# Patient Record
Sex: FEMALE | Race: WHITE | Employment: OTHER | URBAN - METROPOLITAN AREA
[De-identification: names, ages, dates, MRNs, and addresses within clinical notes are randomized per-mention and may not be internally consistent; named-entity substitution may affect disease eponyms.]

---

## 2017-04-17 ENCOUNTER — NEW PATIENT (OUTPATIENT)
Dept: URBAN - METROPOLITAN AREA CLINIC 26 | Facility: CLINIC | Age: 67
End: 2017-04-17

## 2017-04-17 VITALS
WEIGHT: 188 LBS | SYSTOLIC BLOOD PRESSURE: 154 MMHG | HEART RATE: 76 BPM | DIASTOLIC BLOOD PRESSURE: 83 MMHG | BODY MASS INDEX: 34.6 KG/M2 | HEIGHT: 62 IN

## 2017-04-17 DIAGNOSIS — H43.393: ICD-10-CM

## 2017-04-17 DIAGNOSIS — H53.8: ICD-10-CM

## 2017-04-17 DIAGNOSIS — H02.836: ICD-10-CM

## 2017-04-17 DIAGNOSIS — E11.3393: ICD-10-CM

## 2017-04-17 DIAGNOSIS — H04.123: ICD-10-CM

## 2017-04-17 DIAGNOSIS — H02.833: ICD-10-CM

## 2017-04-17 DIAGNOSIS — Z79.4: ICD-10-CM

## 2017-04-17 PROCEDURE — 92226 OPHTHALMOSCOPY (SUB): CPT

## 2017-04-17 PROCEDURE — 92134 CPTRZ OPH DX IMG PST SGM RTA: CPT

## 2017-04-17 PROCEDURE — 99204 OFFICE O/P NEW MOD 45 MIN: CPT

## 2017-04-17 ASSESSMENT — VISUAL ACUITY
OD_SC: 20/25-3
OS_SC: 20/25-1

## 2017-04-17 ASSESSMENT — TONOMETRY
OS_IOP_MMHG: 12
OD_IOP_MMHG: 12

## 2017-05-08 ENCOUNTER — FOLLOW UP (OUTPATIENT)
Dept: URBAN - METROPOLITAN AREA CLINIC 26 | Facility: CLINIC | Age: 67
End: 2017-05-08

## 2017-05-08 VITALS — DIASTOLIC BLOOD PRESSURE: 92 MMHG | SYSTOLIC BLOOD PRESSURE: 153 MMHG | HEIGHT: 55 IN | HEART RATE: 55 BPM

## 2017-05-08 DIAGNOSIS — H43.393: ICD-10-CM

## 2017-05-08 DIAGNOSIS — H53.8: ICD-10-CM

## 2017-05-08 DIAGNOSIS — Z79.4: ICD-10-CM

## 2017-05-08 DIAGNOSIS — E11.3493: ICD-10-CM

## 2017-05-08 PROCEDURE — 92014 COMPRE OPH EXAM EST PT 1/>: CPT

## 2017-05-08 PROCEDURE — 92250 FUNDUS PHOTOGRAPHY W/I&R: CPT

## 2017-05-08 PROCEDURE — 92235 FLUORESCEIN ANGRPH MLTIFRAME: CPT

## 2017-05-08 ASSESSMENT — VISUAL ACUITY
OD_CC: 20/25+1
OS_CC: 20/25+1

## 2017-05-08 ASSESSMENT — TONOMETRY
OS_IOP_MMHG: 18
OD_IOP_MMHG: 17

## 2017-05-09 ENCOUNTER — CLINIC PROCEDURE ONLY (OUTPATIENT)
Dept: URBAN - METROPOLITAN AREA CLINIC 26 | Facility: CLINIC | Age: 67
End: 2017-05-09

## 2017-05-09 DIAGNOSIS — E11.3493: ICD-10-CM

## 2017-05-09 PROCEDURE — 67228 TREATMENT X10SV RETINOPATHY: CPT

## 2017-05-09 ASSESSMENT — VISUAL ACUITY: OD_CC: 20/25

## 2017-05-09 ASSESSMENT — TONOMETRY: OD_IOP_MMHG: 17

## 2017-05-10 ENCOUNTER — CLINIC PROCEDURE ONLY (OUTPATIENT)
Dept: URBAN - METROPOLITAN AREA CLINIC 26 | Facility: CLINIC | Age: 67
End: 2017-05-10

## 2017-05-10 DIAGNOSIS — E11.3493: ICD-10-CM

## 2017-05-10 PROCEDURE — 67228 TREATMENT X10SV RETINOPATHY: CPT

## 2017-05-10 ASSESSMENT — VISUAL ACUITY: OS_SC: 20/30+2

## 2017-05-10 ASSESSMENT — TONOMETRY: OS_IOP_MMHG: 16

## 2017-09-18 ENCOUNTER — FOLLOW UP (OUTPATIENT)
Dept: URBAN - METROPOLITAN AREA CLINIC 26 | Facility: CLINIC | Age: 67
End: 2017-09-18

## 2017-09-18 VITALS
HEIGHT: 62 IN | DIASTOLIC BLOOD PRESSURE: 70 MMHG | WEIGHT: 193 LBS | SYSTOLIC BLOOD PRESSURE: 140 MMHG | BODY MASS INDEX: 35.51 KG/M2 | HEART RATE: 68 BPM

## 2017-09-18 DIAGNOSIS — Z79.4: ICD-10-CM

## 2017-09-18 DIAGNOSIS — H53.8: ICD-10-CM

## 2017-09-18 DIAGNOSIS — H43.393: ICD-10-CM

## 2017-09-18 DIAGNOSIS — E11.3493: ICD-10-CM

## 2017-09-18 PROCEDURE — 92134 CPTRZ OPH DX IMG PST SGM RTA: CPT

## 2017-09-18 PROCEDURE — 92250 FUNDUS PHOTOGRAPHY W/I&R: CPT

## 2017-09-18 PROCEDURE — 92014 COMPRE OPH EXAM EST PT 1/>: CPT

## 2017-09-18 PROCEDURE — 92235 FLUORESCEIN ANGRPH MLTIFRAME: CPT

## 2017-09-18 ASSESSMENT — TONOMETRY
OD_IOP_MMHG: 15
OS_IOP_MMHG: 15

## 2017-09-18 ASSESSMENT — VISUAL ACUITY
OD_SC: 20/20-1
OS_SC: 20/25+2

## 2018-03-19 ENCOUNTER — FOLLOW UP (OUTPATIENT)
Dept: URBAN - METROPOLITAN AREA CLINIC 26 | Facility: CLINIC | Age: 68
End: 2018-03-19

## 2018-03-19 VITALS — HEIGHT: 62 IN | BODY MASS INDEX: 32.2 KG/M2 | WEIGHT: 175 LBS

## 2018-03-19 DIAGNOSIS — H02.836: ICD-10-CM

## 2018-03-19 DIAGNOSIS — H04.123: ICD-10-CM

## 2018-03-19 DIAGNOSIS — Z79.4: ICD-10-CM

## 2018-03-19 DIAGNOSIS — H43.393: ICD-10-CM

## 2018-03-19 DIAGNOSIS — H26.491: ICD-10-CM

## 2018-03-19 DIAGNOSIS — H53.8: ICD-10-CM

## 2018-03-19 DIAGNOSIS — E11.3493: ICD-10-CM

## 2018-03-19 DIAGNOSIS — H02.833: ICD-10-CM

## 2018-03-19 PROCEDURE — 92235 FLUORESCEIN ANGRPH MLTIFRAME: CPT

## 2018-03-19 PROCEDURE — 92014 COMPRE OPH EXAM EST PT 1/>: CPT

## 2018-03-19 PROCEDURE — 92134 CPTRZ OPH DX IMG PST SGM RTA: CPT

## 2018-03-19 PROCEDURE — 92250 FUNDUS PHOTOGRAPHY W/I&R: CPT

## 2018-03-19 ASSESSMENT — TONOMETRY
OS_IOP_MMHG: 15
OD_IOP_MMHG: 15

## 2018-03-19 ASSESSMENT — VISUAL ACUITY
OD_SC: 20/20-1
OS_SC: 20/20

## 2018-09-12 ENCOUNTER — FOLLOW UP (OUTPATIENT)
Dept: URBAN - METROPOLITAN AREA CLINIC 26 | Facility: CLINIC | Age: 68
End: 2018-09-12

## 2018-09-12 VITALS — SYSTOLIC BLOOD PRESSURE: 182 MMHG | HEIGHT: 55 IN | HEART RATE: 51 BPM | DIASTOLIC BLOOD PRESSURE: 96 MMHG

## 2018-09-12 DIAGNOSIS — H43.393: ICD-10-CM

## 2018-09-12 DIAGNOSIS — Z79.4: ICD-10-CM

## 2018-09-12 DIAGNOSIS — H02.836: ICD-10-CM

## 2018-09-12 DIAGNOSIS — H04.123: ICD-10-CM

## 2018-09-12 DIAGNOSIS — H53.8: ICD-10-CM

## 2018-09-12 DIAGNOSIS — H02.833: ICD-10-CM

## 2018-09-12 DIAGNOSIS — E11.3413: ICD-10-CM

## 2018-09-12 PROCEDURE — 92235 FLUORESCEIN ANGRPH MLTIFRAME: CPT

## 2018-09-12 PROCEDURE — 67210 TREATMENT OF RETINAL LESION: CPT

## 2018-09-12 PROCEDURE — 92014 COMPRE OPH EXAM EST PT 1/>: CPT

## 2018-09-12 PROCEDURE — 92250 FUNDUS PHOTOGRAPHY W/I&R: CPT

## 2018-09-12 PROCEDURE — 92134 CPTRZ OPH DX IMG PST SGM RTA: CPT

## 2018-09-12 ASSESSMENT — VISUAL ACUITY
OD_SC: 20/25+2
OS_SC: 20/20-

## 2018-09-12 ASSESSMENT — TONOMETRY
OD_IOP_MMHG: 16
OS_IOP_MMHG: 17

## 2019-01-14 ENCOUNTER — FOLLOW UP (OUTPATIENT)
Dept: URBAN - METROPOLITAN AREA CLINIC 26 | Facility: CLINIC | Age: 69
End: 2019-01-14

## 2019-01-14 VITALS
SYSTOLIC BLOOD PRESSURE: 101 MMHG | WEIGHT: 163 LBS | HEIGHT: 62 IN | BODY MASS INDEX: 30 KG/M2 | HEART RATE: 61 BPM | DIASTOLIC BLOOD PRESSURE: 62 MMHG

## 2019-01-14 DIAGNOSIS — H53.8: ICD-10-CM

## 2019-01-14 DIAGNOSIS — E11.3413: ICD-10-CM

## 2019-01-14 DIAGNOSIS — Z79.4: ICD-10-CM

## 2019-01-14 DIAGNOSIS — H04.123: ICD-10-CM

## 2019-01-14 DIAGNOSIS — H02.836: ICD-10-CM

## 2019-01-14 DIAGNOSIS — H43.393: ICD-10-CM

## 2019-01-14 DIAGNOSIS — H02.833: ICD-10-CM

## 2019-01-14 PROCEDURE — 92014 COMPRE OPH EXAM EST PT 1/>: CPT

## 2019-01-14 PROCEDURE — 67210 TREATMENT OF RETINAL LESION: CPT

## 2019-01-14 PROCEDURE — 92235 FLUORESCEIN ANGRPH MLTIFRAME: CPT

## 2019-01-14 PROCEDURE — 92250 FUNDUS PHOTOGRAPHY W/I&R: CPT

## 2019-01-14 PROCEDURE — 92134 CPTRZ OPH DX IMG PST SGM RTA: CPT

## 2019-01-14 ASSESSMENT — VISUAL ACUITY
OD_CC: 20/20-2
OS_CC: 20/20-1

## 2019-01-14 ASSESSMENT — TONOMETRY
OD_IOP_MMHG: 09
OS_IOP_MMHG: 11

## 2019-01-31 ENCOUNTER — CLINIC PROCEDURE ONLY (OUTPATIENT)
Dept: URBAN - METROPOLITAN AREA CLINIC 26 | Facility: CLINIC | Age: 69
End: 2019-01-31

## 2019-01-31 DIAGNOSIS — E11.3413: ICD-10-CM

## 2019-01-31 PROCEDURE — 67210 TREATMENT OF RETINAL LESION: CPT

## 2019-01-31 ASSESSMENT — VISUAL ACUITY: OS_SC: 20/20

## 2019-01-31 ASSESSMENT — TONOMETRY: OS_IOP_MMHG: 12

## 2019-04-15 ENCOUNTER — POST-OP CHECK (OUTPATIENT)
Dept: URBAN - METROPOLITAN AREA CLINIC 26 | Facility: CLINIC | Age: 69
End: 2019-04-15

## 2019-04-15 VITALS — SYSTOLIC BLOOD PRESSURE: 148 MMHG | HEART RATE: 72 BPM | DIASTOLIC BLOOD PRESSURE: 82 MMHG | HEIGHT: 55 IN

## 2019-04-15 DIAGNOSIS — E11.3413: ICD-10-CM

## 2019-04-15 DIAGNOSIS — Z79.4: ICD-10-CM

## 2019-04-15 DIAGNOSIS — H53.8: ICD-10-CM

## 2019-04-15 DIAGNOSIS — H43.393: ICD-10-CM

## 2019-04-15 PROCEDURE — 92134 CPTRZ OPH DX IMG PST SGM RTA: CPT

## 2019-04-15 PROCEDURE — 99024 POSTOP FOLLOW-UP VISIT: CPT

## 2019-04-15 PROCEDURE — 92235 FLUORESCEIN ANGRPH MLTIFRAME: CPT

## 2019-04-15 PROCEDURE — 92250 FUNDUS PHOTOGRAPHY W/I&R: CPT

## 2019-04-15 ASSESSMENT — VISUAL ACUITY
OD_SC: 20/25+1
OS_SC: 20/30+2

## 2019-04-15 ASSESSMENT — TONOMETRY
OD_IOP_MMHG: 11
OS_IOP_MMHG: 11

## 2019-05-23 ENCOUNTER — FOLLOW UP (OUTPATIENT)
Dept: URBAN - METROPOLITAN AREA CLINIC 26 | Facility: CLINIC | Age: 69
End: 2019-05-23

## 2019-05-23 DIAGNOSIS — H04.123: ICD-10-CM

## 2019-05-23 DIAGNOSIS — H53.8: ICD-10-CM

## 2019-05-23 DIAGNOSIS — Z79.4: ICD-10-CM

## 2019-05-23 DIAGNOSIS — H43.393: ICD-10-CM

## 2019-05-23 DIAGNOSIS — H02.833: ICD-10-CM

## 2019-05-23 DIAGNOSIS — H02.836: ICD-10-CM

## 2019-05-23 DIAGNOSIS — E11.3413: ICD-10-CM

## 2019-05-23 PROCEDURE — 92014 COMPRE OPH EXAM EST PT 1/>: CPT

## 2019-05-23 PROCEDURE — 92134 CPTRZ OPH DX IMG PST SGM RTA: CPT

## 2019-05-23 PROCEDURE — 67210 TREATMENT OF RETINAL LESION: CPT

## 2019-05-23 ASSESSMENT — TONOMETRY
OS_IOP_MMHG: 12
OD_IOP_MMHG: 14

## 2019-05-23 ASSESSMENT — VISUAL ACUITY
OS_SC: 20/25-2
OD_SC: 20/20

## 2019-12-10 ENCOUNTER — FOLLOW UP (OUTPATIENT)
Dept: URBAN - METROPOLITAN AREA CLINIC 26 | Facility: CLINIC | Age: 69
End: 2019-12-10

## 2019-12-10 VITALS — HEART RATE: 60 BPM | DIASTOLIC BLOOD PRESSURE: 88 MMHG | HEIGHT: 55 IN | SYSTOLIC BLOOD PRESSURE: 145 MMHG

## 2019-12-10 DIAGNOSIS — Z79.4: ICD-10-CM

## 2019-12-10 DIAGNOSIS — H02.836: ICD-10-CM

## 2019-12-10 DIAGNOSIS — H43.393: ICD-10-CM

## 2019-12-10 DIAGNOSIS — H02.833: ICD-10-CM

## 2019-12-10 DIAGNOSIS — E11.3413: ICD-10-CM

## 2019-12-10 DIAGNOSIS — H53.8: ICD-10-CM

## 2019-12-10 DIAGNOSIS — H04.123: ICD-10-CM

## 2019-12-10 PROCEDURE — 92250 FUNDUS PHOTOGRAPHY W/I&R: CPT

## 2019-12-10 PROCEDURE — 92134 CPTRZ OPH DX IMG PST SGM RTA: CPT

## 2019-12-10 PROCEDURE — 92235 FLUORESCEIN ANGRPH MLTIFRAME: CPT

## 2019-12-10 PROCEDURE — 67210 TREATMENT OF RETINAL LESION: CPT

## 2019-12-10 PROCEDURE — 92014 COMPRE OPH EXAM EST PT 1/>: CPT

## 2019-12-10 ASSESSMENT — VISUAL ACUITY
OS_SC: 20/25-2
OD_SC: 20/20-2

## 2019-12-10 ASSESSMENT — TONOMETRY
OD_IOP_MMHG: 12
OS_IOP_MMHG: 10

## 2019-12-11 ENCOUNTER — CLINIC PROCEDURE ONLY (OUTPATIENT)
Dept: URBAN - METROPOLITAN AREA CLINIC 26 | Facility: CLINIC | Age: 69
End: 2019-12-11

## 2019-12-11 DIAGNOSIS — E11.3413: ICD-10-CM

## 2019-12-11 PROCEDURE — 67210 TREATMENT OF RETINAL LESION: CPT

## 2019-12-11 ASSESSMENT — VISUAL ACUITY: OS_SC: 20/30+2

## 2019-12-11 ASSESSMENT — TONOMETRY: OS_IOP_MMHG: 12

## 2020-09-15 ENCOUNTER — FOLLOW UP (OUTPATIENT)
Dept: URBAN - METROPOLITAN AREA CLINIC 26 | Facility: CLINIC | Age: 70
End: 2020-09-15

## 2020-09-15 VITALS
DIASTOLIC BLOOD PRESSURE: 80 MMHG | HEART RATE: 69 BPM | HEIGHT: 62 IN | BODY MASS INDEX: 32.2 KG/M2 | SYSTOLIC BLOOD PRESSURE: 133 MMHG | WEIGHT: 175 LBS

## 2020-09-15 DIAGNOSIS — H43.393: ICD-10-CM

## 2020-09-15 DIAGNOSIS — Z79.4: ICD-10-CM

## 2020-09-15 DIAGNOSIS — H02.836: ICD-10-CM

## 2020-09-15 DIAGNOSIS — E11.3413: ICD-10-CM

## 2020-09-15 DIAGNOSIS — H04.123: ICD-10-CM

## 2020-09-15 DIAGNOSIS — H02.833: ICD-10-CM

## 2020-09-15 DIAGNOSIS — H53.8: ICD-10-CM

## 2020-09-15 PROCEDURE — 92134 CPTRZ OPH DX IMG PST SGM RTA: CPT

## 2020-09-15 PROCEDURE — 67210 TREATMENT OF RETINAL LESION: CPT

## 2020-09-15 PROCEDURE — 92235 FLUORESCEIN ANGRPH MLTIFRAME: CPT

## 2020-09-15 PROCEDURE — 92014 COMPRE OPH EXAM EST PT 1/>: CPT

## 2020-09-15 PROCEDURE — 92250 FUNDUS PHOTOGRAPHY W/I&R: CPT

## 2020-09-15 ASSESSMENT — TONOMETRY
OD_IOP_MMHG: 12
OS_IOP_MMHG: 11

## 2020-09-15 ASSESSMENT — VISUAL ACUITY
OD_SC: 20/20
OS_SC: 20/30

## 2020-09-16 ENCOUNTER — OFFICE VISIT (OUTPATIENT)
Dept: URBAN - METROPOLITAN AREA CLINIC 63 | Facility: CLINIC | Age: 70
End: 2020-09-16

## 2020-09-22 ENCOUNTER — CLINIC PROCEDURE ONLY (OUTPATIENT)
Dept: URBAN - METROPOLITAN AREA CLINIC 26 | Facility: CLINIC | Age: 70
End: 2020-09-22

## 2020-09-22 DIAGNOSIS — E11.3413: ICD-10-CM

## 2020-09-22 PROCEDURE — 67210 TREATMENT OF RETINAL LESION: CPT

## 2020-09-22 ASSESSMENT — VISUAL ACUITY: OS_SC: 20/25

## 2020-09-22 ASSESSMENT — TONOMETRY: OS_IOP_MMHG: 14

## 2020-10-01 ENCOUNTER — OFFICE VISIT (OUTPATIENT)
Dept: URBAN - METROPOLITAN AREA CLINIC 121 | Facility: CLINIC | Age: 70
End: 2020-10-01

## 2021-02-01 ENCOUNTER — OFFICE VISIT (OUTPATIENT)
Dept: URBAN - METROPOLITAN AREA CLINIC 63 | Facility: CLINIC | Age: 71
End: 2021-02-01

## 2021-03-08 ENCOUNTER — OFFICE VISIT (OUTPATIENT)
Dept: URBAN - METROPOLITAN AREA CLINIC 63 | Facility: CLINIC | Age: 71
End: 2021-03-08

## 2021-03-15 ENCOUNTER — FOLLOW UP (OUTPATIENT)
Dept: URBAN - METROPOLITAN AREA CLINIC 26 | Facility: CLINIC | Age: 71
End: 2021-03-15

## 2021-03-15 VITALS — HEIGHT: 62 IN | BODY MASS INDEX: 32.2 KG/M2 | WEIGHT: 175 LBS

## 2021-03-15 DIAGNOSIS — Z79.4: ICD-10-CM

## 2021-03-15 DIAGNOSIS — H53.8: ICD-10-CM

## 2021-03-15 DIAGNOSIS — H43.393: ICD-10-CM

## 2021-03-15 DIAGNOSIS — E11.3413: ICD-10-CM

## 2021-03-15 PROCEDURE — 92250 FUNDUS PHOTOGRAPHY W/I&R: CPT

## 2021-03-15 PROCEDURE — 92235 FLUORESCEIN ANGRPH MLTIFRAME: CPT

## 2021-03-15 PROCEDURE — 92134 CPTRZ OPH DX IMG PST SGM RTA: CPT

## 2021-03-15 PROCEDURE — 67210 TREATMENT OF RETINAL LESION: CPT

## 2021-03-15 PROCEDURE — 92014 COMPRE OPH EXAM EST PT 1/>: CPT

## 2021-03-15 ASSESSMENT — VISUAL ACUITY
OS_SC: 20/25-1
OD_SC: 20/25-2

## 2021-03-15 ASSESSMENT — TONOMETRY
OD_IOP_MMHG: 12
OS_IOP_MMHG: 12

## 2021-03-16 ENCOUNTER — UNSCHEDULED FOLLOW UP (OUTPATIENT)
Dept: URBAN - METROPOLITAN AREA CLINIC 26 | Facility: CLINIC | Age: 71
End: 2021-03-16

## 2021-03-16 DIAGNOSIS — H43.393: ICD-10-CM

## 2021-03-16 DIAGNOSIS — H57.11: ICD-10-CM

## 2021-03-16 DIAGNOSIS — E11.3413: ICD-10-CM

## 2021-03-16 DIAGNOSIS — Z79.4: ICD-10-CM

## 2021-03-16 DIAGNOSIS — H53.8: ICD-10-CM

## 2021-03-16 PROCEDURE — 99024 POSTOP FOLLOW-UP VISIT: CPT

## 2021-03-16 ASSESSMENT — VISUAL ACUITY: OD_SC: 20/20-2

## 2021-03-16 ASSESSMENT — TONOMETRY: OD_IOP_MMHG: 14

## 2021-03-29 ENCOUNTER — CLINIC PROCEDURE ONLY (OUTPATIENT)
Dept: URBAN - METROPOLITAN AREA CLINIC 26 | Facility: CLINIC | Age: 71
End: 2021-03-29

## 2021-03-29 DIAGNOSIS — E11.3413: ICD-10-CM

## 2021-03-29 PROCEDURE — 67210 TREATMENT OF RETINAL LESION: CPT

## 2021-03-29 ASSESSMENT — VISUAL ACUITY: OS_SC: 20/30+2

## 2021-03-29 ASSESSMENT — TONOMETRY: OS_IOP_MMHG: 15

## 2021-05-17 ENCOUNTER — OFFICE VISIT (OUTPATIENT)
Dept: URBAN - METROPOLITAN AREA SURGERY CENTER 4 | Facility: SURGERY CENTER | Age: 71
End: 2021-05-17

## 2021-05-19 LAB — PATHOLOGY (INDENTED REPORT): (no result)

## 2021-06-04 ENCOUNTER — OFFICE VISIT (OUTPATIENT)
Dept: URBAN - METROPOLITAN AREA CLINIC 63 | Facility: CLINIC | Age: 71
End: 2021-06-04

## 2021-09-14 ENCOUNTER — FOLLOW UP (OUTPATIENT)
Dept: URBAN - METROPOLITAN AREA CLINIC 26 | Facility: CLINIC | Age: 71
End: 2021-09-14

## 2021-09-14 VITALS
DIASTOLIC BLOOD PRESSURE: 77 MMHG | WEIGHT: 174 LBS | HEART RATE: 65 BPM | BODY MASS INDEX: 32.02 KG/M2 | SYSTOLIC BLOOD PRESSURE: 157 MMHG | HEIGHT: 62 IN

## 2021-09-14 DIAGNOSIS — E11.3413: ICD-10-CM

## 2021-09-14 DIAGNOSIS — H57.11: ICD-10-CM

## 2021-09-14 DIAGNOSIS — H04.123: ICD-10-CM

## 2021-09-14 DIAGNOSIS — H53.8: ICD-10-CM

## 2021-09-14 DIAGNOSIS — Z79.4: ICD-10-CM

## 2021-09-14 DIAGNOSIS — H43.393: ICD-10-CM

## 2021-09-14 PROCEDURE — 92235 FLUORESCEIN ANGRPH MLTIFRAME: CPT

## 2021-09-14 PROCEDURE — 67210 TREATMENT OF RETINAL LESION: CPT

## 2021-09-14 PROCEDURE — 92250 FUNDUS PHOTOGRAPHY W/I&R: CPT

## 2021-09-14 PROCEDURE — 92014 COMPRE OPH EXAM EST PT 1/>: CPT

## 2021-09-14 PROCEDURE — 92134 CPTRZ OPH DX IMG PST SGM RTA: CPT

## 2021-09-14 ASSESSMENT — TONOMETRY
OS_IOP_MMHG: 17
OD_IOP_MMHG: 19

## 2021-09-14 ASSESSMENT — VISUAL ACUITY
OS_SC: 20/20-1
OD_SC: 20/25+2

## 2021-09-15 ENCOUNTER — OFFICE VISIT (OUTPATIENT)
Dept: URBAN - METROPOLITAN AREA CLINIC 60 | Facility: CLINIC | Age: 71
End: 2021-09-15

## 2021-09-28 ENCOUNTER — OFFICE VISIT (OUTPATIENT)
Dept: URBAN - METROPOLITAN AREA TELEHEALTH 2 | Facility: TELEHEALTH | Age: 71
End: 2021-09-28

## 2021-09-28 ENCOUNTER — OFFICE VISIT (OUTPATIENT)
Dept: URBAN - METROPOLITAN AREA CLINIC 63 | Facility: CLINIC | Age: 71
End: 2021-09-28

## 2021-10-19 NOTE — PATIENT DISCUSSION
The patient expressed a desire to see through the full range of vision from distance, to middle, to near without glasses. The limitations of advanced lens technology were reviewed and the recommendation was made for the Synergy IOL OU. The patient understands there is no guarantee of glasses free vision and the more complete range of vision from the Synergy lens comes with a trade-off. Side effects include halos around point sources of light at night and failure to adapt in 1 in 500 cases resulting in the need for exchange of the lens.  Enhancement, including Lasik, may be necessary to achieve the full uncorrected vision potential.

## 2021-10-19 NOTE — PATIENT DISCUSSION
***12/7/21 The patient has reconsidered their goal for surgery. Reassess for Synergy OS, goal of emmetropia. -VWindisch***.

## 2021-10-19 NOTE — PATIENT DISCUSSION
***12/7/21 The patient has reconsidered their goal for surgery. The patient expressed a desire to see through the full range of vision from distance, to middle, to near without glasses. The limitations of advanced lens technology were reviewed and the recommendation was made for the Synergy IOL OU. The patient understands there is no guarantee of glasses free vision and the more complete range of vision from the Synergy lens comes with a trade-off. Side effects include halos around point sources of light at night and failure to adapt in 1 in 500 cases resulting in the need for exchange of the lens. Enhancement, including Lasik, may be necessary to achieve the full uncorrected vision potential. The patient elects Synergy OD, goal of emmetropia. -VWindisch***.

## 2021-11-09 ENCOUNTER — OFFICE VISIT (OUTPATIENT)
Dept: URBAN - METROPOLITAN AREA CLINIC 63 | Facility: CLINIC | Age: 71
End: 2021-11-09

## 2021-11-30 ENCOUNTER — CLINIC PROCEDURE ONLY (OUTPATIENT)
Dept: URBAN - METROPOLITAN AREA CLINIC 26 | Facility: CLINIC | Age: 71
End: 2021-11-30

## 2021-11-30 VITALS — HEIGHT: 55 IN | DIASTOLIC BLOOD PRESSURE: 93 MMHG | HEART RATE: 64 BPM | SYSTOLIC BLOOD PRESSURE: 172 MMHG

## 2021-11-30 DIAGNOSIS — E11.3412: ICD-10-CM

## 2021-11-30 DIAGNOSIS — E11.3411: ICD-10-CM

## 2021-11-30 PROCEDURE — 67210 TREATMENT OF RETINAL LESION: CPT

## 2021-11-30 PROCEDURE — 67028 INJECTION EYE DRUG: CPT

## 2021-11-30 ASSESSMENT — VISUAL ACUITY
OS_SC: 20/20-1
OD_SC: 20/25+1

## 2021-11-30 ASSESSMENT — TONOMETRY
OS_IOP_MMHG: 13
OD_IOP_MMHG: 13

## 2021-12-01 ENCOUNTER — EMERGENCY VISIT (OUTPATIENT)
Dept: URBAN - METROPOLITAN AREA CLINIC 26 | Facility: CLINIC | Age: 71
End: 2021-12-01

## 2021-12-01 DIAGNOSIS — E11.3412: ICD-10-CM

## 2021-12-01 DIAGNOSIS — E11.3411: ICD-10-CM

## 2021-12-01 PROCEDURE — 99024 POSTOP FOLLOW-UP VISIT: CPT

## 2021-12-01 RX ORDER — LOTEPREDNOL ETABONATE 2.5 MG/ML: 1 SUSPENSION/ DROPS OPHTHALMIC

## 2021-12-01 ASSESSMENT — VISUAL ACUITY
OD_PH: 20/40+2
OD_SC: 20/40-1
OS_SC: 20/30+2

## 2021-12-01 ASSESSMENT — TONOMETRY
OS_IOP_MMHG: 10
OD_IOP_MMHG: 11

## 2022-01-11 ENCOUNTER — FOLLOW UP (OUTPATIENT)
Dept: URBAN - METROPOLITAN AREA CLINIC 26 | Facility: CLINIC | Age: 72
End: 2022-01-11

## 2022-01-11 VITALS
SYSTOLIC BLOOD PRESSURE: 149 MMHG | WEIGHT: 175 LBS | BODY MASS INDEX: 32.2 KG/M2 | HEART RATE: 67 BPM | HEIGHT: 62 IN | DIASTOLIC BLOOD PRESSURE: 95 MMHG

## 2022-01-11 DIAGNOSIS — H43.393: ICD-10-CM

## 2022-01-11 DIAGNOSIS — H04.123: ICD-10-CM

## 2022-01-11 DIAGNOSIS — E11.3411: ICD-10-CM

## 2022-01-11 DIAGNOSIS — E11.3412: ICD-10-CM

## 2022-01-11 DIAGNOSIS — H53.8: ICD-10-CM

## 2022-01-11 DIAGNOSIS — H57.11: ICD-10-CM

## 2022-01-11 PROCEDURE — 92134 CPTRZ OPH DX IMG PST SGM RTA: CPT

## 2022-01-11 PROCEDURE — 92014 COMPRE OPH EXAM EST PT 1/>: CPT

## 2022-01-11 PROCEDURE — 67210 TREATMENT OF RETINAL LESION: CPT

## 2022-01-11 PROCEDURE — 92250 FUNDUS PHOTOGRAPHY W/I&R: CPT

## 2022-01-11 PROCEDURE — 92235 FLUORESCEIN ANGRPH MLTIFRAME: CPT

## 2022-01-11 ASSESSMENT — TONOMETRY
OD_IOP_MMHG: 12
OS_IOP_MMHG: 13

## 2022-01-11 ASSESSMENT — VISUAL ACUITY
OS_SC: 20/20-2
OD_SC: 20/25-1

## 2022-01-25 ENCOUNTER — CLINIC PROCEDURE ONLY (OUTPATIENT)
Dept: URBAN - METROPOLITAN AREA CLINIC 26 | Facility: CLINIC | Age: 72
End: 2022-01-25

## 2022-01-25 DIAGNOSIS — E11.3412: ICD-10-CM

## 2022-01-25 PROCEDURE — 67210 TREATMENT OF RETINAL LESION: CPT

## 2022-01-25 ASSESSMENT — VISUAL ACUITY: OS_SC: 20/20-2

## 2022-01-25 ASSESSMENT — TONOMETRY: OS_IOP_MMHG: 14

## 2022-02-04 ENCOUNTER — CLINIC PROCEDURE ONLY (OUTPATIENT)
Dept: URBAN - METROPOLITAN AREA CLINIC 26 | Facility: CLINIC | Age: 72
End: 2022-02-04

## 2022-02-04 DIAGNOSIS — E11.3411: ICD-10-CM

## 2022-02-04 PROCEDURE — 67028 INJECTION EYE DRUG: CPT

## 2022-02-04 ASSESSMENT — VISUAL ACUITY: OD_SC: 20/25-1

## 2022-02-04 ASSESSMENT — TONOMETRY: OD_IOP_MMHG: 12

## 2022-03-23 ENCOUNTER — FOLLOW UP (OUTPATIENT)
Dept: URBAN - METROPOLITAN AREA CLINIC 26 | Facility: CLINIC | Age: 72
End: 2022-03-23

## 2022-03-23 DIAGNOSIS — H57.11: ICD-10-CM

## 2022-03-23 DIAGNOSIS — H53.8: ICD-10-CM

## 2022-03-23 DIAGNOSIS — H43.393: ICD-10-CM

## 2022-03-23 DIAGNOSIS — E11.3413: ICD-10-CM

## 2022-03-23 DIAGNOSIS — Z79.4: ICD-10-CM

## 2022-03-23 DIAGNOSIS — D31.32: ICD-10-CM

## 2022-03-23 DIAGNOSIS — H04.123: ICD-10-CM

## 2022-03-23 PROCEDURE — 67028 INJECTION EYE DRUG: CPT

## 2022-03-23 PROCEDURE — 92134 CPTRZ OPH DX IMG PST SGM RTA: CPT

## 2022-03-23 PROCEDURE — 99024 POSTOP FOLLOW-UP VISIT: CPT

## 2022-03-23 PROCEDURE — 92250 FUNDUS PHOTOGRAPHY W/I&R: CPT

## 2022-03-23 ASSESSMENT — VISUAL ACUITY
OD_SC: 20/25
OS_SC: 20/20-2

## 2022-03-23 ASSESSMENT — TONOMETRY
OS_IOP_MMHG: 14
OD_IOP_MMHG: 15

## 2022-03-28 ENCOUNTER — CLINIC PROCEDURE ONLY (OUTPATIENT)
Dept: URBAN - METROPOLITAN AREA CLINIC 26 | Facility: CLINIC | Age: 72
End: 2022-03-28

## 2022-03-28 DIAGNOSIS — E11.3413: ICD-10-CM

## 2022-03-28 PROCEDURE — 67028 INJECTION EYE DRUG: CPT

## 2022-03-28 ASSESSMENT — TONOMETRY
OD_IOP_MMHG: 19
OS_IOP_MMHG: 10

## 2022-07-09 ENCOUNTER — TELEPHONE ENCOUNTER (OUTPATIENT)
Dept: URBAN - METROPOLITAN AREA CLINIC 121 | Facility: CLINIC | Age: 72
End: 2022-07-09

## 2022-07-09 RX ORDER — FLUOXETINE HYDROCHLORIDE 20 MG/1
CAPSULE ORAL ONCE A DAY
Refills: 0 | OUTPATIENT
Start: 2018-11-29 | End: 2021-09-28

## 2022-07-09 RX ORDER — PANTOPRAZOLE SODIUM 40 MG/1
TABLET, DELAYED RELEASE ORAL ONCE A DAY
Refills: 0 | OUTPATIENT
Start: 2018-04-10 | End: 2018-08-23

## 2022-07-09 RX ORDER — ATORVASTATIN CALCIUM 10 MG/1
TABLET, FILM COATED ORAL ONCE A DAY
Refills: 0 | OUTPATIENT
Start: 2018-11-29 | End: 2021-09-28

## 2022-07-09 RX ORDER — GLYBURIDE 5 MG/1
TABLET ORAL
Refills: 0 | OUTPATIENT
Start: 2009-02-17 | End: 2018-04-10

## 2022-07-09 RX ORDER — INSULIN GLARGINE 100 [IU]/ML
INJECTION, SOLUTION SUBCUTANEOUS
Refills: 0 | OUTPATIENT
Start: 2018-11-29 | End: 2021-09-28

## 2022-07-09 RX ORDER — FLUOXETINE HYDROCHLORIDE 20 MG/1
CAPSULE ORAL ONCE A DAY
Refills: 0 | OUTPATIENT
Start: 2021-09-28 | End: 2021-09-28

## 2022-07-09 RX ORDER — HYDROCHLOROTHIAZIDE 12.5 MG/1
CAPSULE ORAL ONCE A DAY
Refills: 0 | OUTPATIENT
Start: 2018-04-10 | End: 2018-08-23

## 2022-07-09 RX ORDER — FLUOXETINE HYDROCHLORIDE 20 MG/1
CAPSULE ORAL ONCE A DAY
Refills: 0 | OUTPATIENT
Start: 2018-08-23 | End: 2018-11-29

## 2022-07-09 RX ORDER — METOPROLOL SUCCINATE 50 MG/1
TABLET, EXTENDED RELEASE ORAL
Refills: 0 | OUTPATIENT
Start: 2018-04-10 | End: 2018-08-23

## 2022-07-09 RX ORDER — FLUOXETINE HYDROCHLORIDE 20 MG/1
CAPSULE ORAL ONCE A DAY
Refills: 0 | OUTPATIENT
Start: 2018-04-10 | End: 2018-08-23

## 2022-07-09 RX ORDER — SIMVASTATIN 40 MG/1
TABLET, FILM COATED ORAL
Refills: 0 | OUTPATIENT
Start: 2018-04-10 | End: 2018-08-23

## 2022-07-09 RX ORDER — INSULIN GLARGINE 100 [IU]/ML
INJECTION, SOLUTION SUBCUTANEOUS
Refills: 0 | OUTPATIENT
Start: 2018-04-10 | End: 2018-08-23

## 2022-07-09 RX ORDER — LANSOPRAZOLE 30 MG/1
CAPSULE, DELAYED RELEASE ORAL TWICE A DAY
Refills: 0 | OUTPATIENT
Start: 2021-09-28 | End: 2021-09-28

## 2022-07-09 RX ORDER — LEVOTHYROXINE SODIUM 75 UG/1
TABLET ORAL ONCE A DAY
Refills: 0 | OUTPATIENT
Start: 2020-12-07 | End: 2021-09-28

## 2022-07-09 RX ORDER — ATORVASTATIN CALCIUM 40 MG/1
TABLET, FILM COATED ORAL ONCE A DAY
Refills: 0 | OUTPATIENT
Start: 2021-02-22 | End: 2021-09-28

## 2022-07-09 RX ORDER — CARVEDILOL 12.5 MG/1
TABLET, FILM COATED ORAL ONCE A DAY
Refills: 0 | OUTPATIENT
Start: 2021-01-19 | End: 2021-09-28

## 2022-07-09 RX ORDER — LISINOPRIL 20 MG/1
HALF A TAB TABLET ORAL
Refills: 0 | OUTPATIENT
Start: 2018-11-29 | End: 2021-09-28

## 2022-07-09 RX ORDER — LEVOTHYROXINE SODIUM 75 UG/1
TABLET ORAL ONCE A DAY
Refills: 0 | OUTPATIENT
Start: 2018-11-29 | End: 2021-09-28

## 2022-07-09 RX ORDER — CANDESARTAN CILEXETIL 32 MG/1
TABLET ORAL ONCE A DAY
Refills: 0 | OUTPATIENT
Start: 2021-01-29 | End: 2021-09-28

## 2022-07-09 RX ORDER — INSULIN GLARGINE 100 [IU]/ML
INJECTION, SOLUTION SUBCUTANEOUS
Refills: 0 | OUTPATIENT
Start: 2018-08-23 | End: 2018-11-29

## 2022-07-09 RX ORDER — INSULIN LISPRO 100 [IU]/ML
INJECTION, SOLUTION INTRAVENOUS; SUBCUTANEOUS
Refills: 0 | OUTPATIENT
Start: 2018-08-23 | End: 2018-11-29

## 2022-07-09 RX ORDER — INSULIN LISPRO 100 [IU]/ML
INJECTION, SOLUTION INTRAVENOUS; SUBCUTANEOUS
Refills: 0 | OUTPATIENT
Start: 2018-11-29 | End: 2021-09-28

## 2022-07-09 RX ORDER — SIMVASTATIN 40 MG/1
TABLET, FILM COATED ORAL
Refills: 0 | OUTPATIENT
Start: 2018-11-29 | End: 2018-11-29

## 2022-07-09 RX ORDER — LANSOPRAZOLE 30 MG/1
TWICE A DAY CAPSULE, DELAYED RELEASE ORAL TWICE A DAY
Refills: 0 | OUTPATIENT
Start: 2021-12-15 | End: 2022-02-28

## 2022-07-09 RX ORDER — ONDANSETRON HYDROCHLORIDE 4 MG/1
USE AS DIRECTED TAKE 1 TABLET 30 MINUTES PRIOR TO EACH HALF OF COLONOSCOPY PREP TO PREVENT NAUSEA TABLET, FILM COATED ORAL
Refills: 0 | OUTPATIENT
Start: 2021-03-08 | End: 2021-09-28

## 2022-07-09 RX ORDER — METFORMIN HCL 1000 MG/1
TABLET ORAL ONCE A DAY
Refills: 0 | OUTPATIENT
Start: 2021-02-08 | End: 2021-09-28

## 2022-07-09 RX ORDER — INSULIN GLARGINE 100 [IU]/ML
INJECTION, SOLUTION SUBCUTANEOUS
Refills: 0 | OUTPATIENT
Start: 2009-02-17 | End: 2018-04-10

## 2022-07-09 RX ORDER — LEVOTHYROXINE SODIUM 75 UG/1
TABLET ORAL ONCE A DAY
Refills: 0 | OUTPATIENT
Start: 2018-04-10 | End: 2018-08-23

## 2022-07-09 RX ORDER — LEVOTHYROXINE SODIUM 75 UG/1
TABLET ORAL ONCE A DAY
Refills: 0 | OUTPATIENT
Start: 2018-08-23 | End: 2018-11-29

## 2022-07-09 RX ORDER — LANSOPRAZOLE 30 MG/1
TWICE A DAY CAPSULE, DELAYED RELEASE ORAL TWICE A DAY
Refills: 0 | OUTPATIENT
Start: 2021-07-13 | End: 2021-10-27

## 2022-07-09 RX ORDER — HYDROCHLOROTHIAZIDE 25 MG/1
TABLET ORAL ONCE A DAY
Refills: 0 | OUTPATIENT
Start: 2021-01-19 | End: 2021-09-28

## 2022-07-09 RX ORDER — LISINOPRIL 20 MG/1
TABLET ORAL TWICE A DAY
Refills: 0 | OUTPATIENT
Start: 2018-04-10 | End: 2018-08-23

## 2022-07-09 RX ORDER — INSULIN LISPRO 100 [IU]/ML
INJECTION, SOLUTION INTRAVENOUS; SUBCUTANEOUS ONCE A DAY
Refills: 0 | OUTPATIENT
Start: 2021-03-04 | End: 2021-09-28

## 2022-07-09 RX ORDER — METFORMIN HCL 1000 MG/1
TABLET ORAL TWICE A DAY
Refills: 0 | OUTPATIENT
Start: 2018-08-23 | End: 2018-11-29

## 2022-07-09 RX ORDER — ENALAPRIL MALEATE 10 MG/1
TABLET ORAL
Refills: 0 | OUTPATIENT
Start: 2009-02-17 | End: 2018-04-10

## 2022-07-09 RX ORDER — HYDRALAZINE HYDROCHLORIDE 10 MG/1
TABLET ORAL TWICE A DAY
Refills: 0 | OUTPATIENT
Start: 2018-11-29 | End: 2021-09-28

## 2022-07-09 RX ORDER — LANSOPRAZOLE 30 MG/1
ONCE A DAY CAPSULE, DELAYED RELEASE ORAL ONCE A DAY
Refills: 1 | OUTPATIENT
Start: 2021-03-08 | End: 2021-04-06

## 2022-07-09 RX ORDER — LANSOPRAZOLE 30 MG/1
ONCE A DAY CAPSULE, DELAYED RELEASE ORAL ONCE A DAY
Refills: 0 | OUTPATIENT
Start: 2021-04-06 | End: 2021-05-21

## 2022-07-09 RX ORDER — SIMVASTATIN 40 MG/1
TABLET, FILM COATED ORAL
Refills: 0 | OUTPATIENT
Start: 2018-08-23 | End: 2018-11-29

## 2022-07-09 RX ORDER — CELECOXIB 200 MG/1
CAPSULE ORAL
Refills: 0 | OUTPATIENT
Start: 2021-01-18 | End: 2021-09-28

## 2022-07-09 RX ORDER — METFORMIN HCL 1000 MG/1
TABLET ORAL TWICE A DAY
Refills: 0 | OUTPATIENT
Start: 2018-04-10 | End: 2018-08-23

## 2022-07-09 RX ORDER — INSULIN LISPRO 100 [IU]/ML
INJECTION, SUSPENSION SUBCUTANEOUS
Refills: 0 | OUTPATIENT
Start: 2018-11-29 | End: 2021-09-28

## 2022-07-09 RX ORDER — METOPROLOL SUCCINATE 50 MG/1
TABLET, EXTENDED RELEASE ORAL
Refills: 0 | OUTPATIENT
Start: 2018-08-23 | End: 2018-11-29

## 2022-07-09 RX ORDER — INSULIN LISPRO 100 [IU]/ML
INJECTION, SUSPENSION SUBCUTANEOUS
Refills: 0 | OUTPATIENT
Start: 2018-04-10 | End: 2018-08-23

## 2022-07-09 RX ORDER — METFORMIN HCL 1000 MG/1
TABLET ORAL
Refills: 0 | OUTPATIENT
Start: 2009-02-17 | End: 2018-04-10

## 2022-07-09 RX ORDER — SIMVASTATIN 10 MG/1
TABLET, FILM COATED ORAL
Refills: 0 | OUTPATIENT
Start: 2009-02-17 | End: 2018-04-10

## 2022-07-09 RX ORDER — METOPROLOL SUCCINATE 50 MG/1
TABLET, EXTENDED RELEASE ORAL
Refills: 0 | OUTPATIENT
Start: 2009-02-17 | End: 2018-04-10

## 2022-07-09 RX ORDER — CARVEDILOL 12.5 MG/1
TABLET, FILM COATED ORAL TWICE A DAY
Refills: 0 | OUTPATIENT
Start: 2018-11-29 | End: 2021-09-28

## 2022-07-09 RX ORDER — LANSOPRAZOLE 30 MG/1
TWICE A DAY CAPSULE, DELAYED RELEASE ORAL TWICE A DAY
Refills: 0 | OUTPATIENT
Start: 2021-05-21 | End: 2021-07-13

## 2022-07-09 RX ORDER — LANSOPRAZOLE 30 MG/1
TWICE A DAY CAPSULE, DELAYED RELEASE ORAL TWICE A DAY
Refills: 0 | OUTPATIENT
Start: 2021-10-27 | End: 2021-12-15

## 2022-07-09 RX ORDER — HYDROCHLOROTHIAZIDE 12.5 MG/1
CAPSULE ORAL ONCE A DAY
Refills: 0 | OUTPATIENT
Start: 2018-08-23 | End: 2018-11-29

## 2022-07-09 RX ORDER — PANTOPRAZOLE SODIUM 40 MG/1
TABLET, DELAYED RELEASE ORAL ONCE A DAY
Refills: 0 | OUTPATIENT
Start: 2018-11-29 | End: 2021-09-28

## 2022-07-09 RX ORDER — INSULIN LISPRO 100 [IU]/ML
INJECTION, SOLUTION INTRAVENOUS; SUBCUTANEOUS
Refills: 0 | OUTPATIENT
Start: 2018-04-10 | End: 2018-08-23

## 2022-07-09 RX ORDER — HYDROCHLOROTHIAZIDE 12.5 MG/1
CAPSULE ORAL ONCE A DAY
Refills: 0 | OUTPATIENT
Start: 2018-11-29 | End: 2021-09-28

## 2022-07-09 RX ORDER — INSULIN LISPRO 100 [IU]/ML
INJECTION, SUSPENSION SUBCUTANEOUS
Refills: 0 | OUTPATIENT
Start: 2018-08-23 | End: 2018-11-29

## 2022-07-09 RX ORDER — LISINOPRIL 20 MG/1
TABLET ORAL TWICE A DAY
Refills: 0 | OUTPATIENT
Start: 2018-11-29 | End: 2018-11-29

## 2022-07-09 RX ORDER — LISINOPRIL 20 MG/1
TABLET ORAL TWICE A DAY
Refills: 0 | OUTPATIENT
Start: 2018-08-23 | End: 2018-11-29

## 2022-07-09 RX ORDER — INSULIN GLARGINE 100 [IU]/ML
INJECTION, SOLUTION SUBCUTANEOUS
Refills: 0 | OUTPATIENT
Start: 2018-11-29 | End: 2018-11-29

## 2022-07-09 RX ORDER — FLUOXETINE HYDROCHLORIDE 20 MG/1
CAPSULE ORAL ONCE A DAY
Refills: 0 | OUTPATIENT
Start: 2020-12-07 | End: 2021-09-28

## 2022-07-09 RX ORDER — PANTOPRAZOLE SODIUM 40 MG/1
TABLET, DELAYED RELEASE ORAL ONCE A DAY
Refills: 0 | OUTPATIENT
Start: 2018-08-23 | End: 2018-11-29

## 2022-07-09 RX ORDER — FLUOXETINE HYDROCHLORIDE 40 MG/1
60MG QD CAPSULE ORAL
Refills: 0 | OUTPATIENT
Start: 2009-02-17 | End: 2018-04-10

## 2022-07-10 ENCOUNTER — TELEPHONE ENCOUNTER (OUTPATIENT)
Dept: URBAN - METROPOLITAN AREA CLINIC 121 | Facility: CLINIC | Age: 72
End: 2022-07-10

## 2022-07-10 RX ORDER — LEVOTHYROXINE SODIUM 75 UG/1
TABLET ORAL ONCE A DAY
Refills: 0 | Status: ACTIVE | COMMUNITY
Start: 2021-09-28

## 2022-07-10 RX ORDER — CANDESARTAN CILEXETIL 32 MG/1
TABLET ORAL
Refills: 0 | Status: ACTIVE | COMMUNITY
Start: 2021-09-28

## 2022-07-10 RX ORDER — DICLOFENAC SODIUM 50 MG/1
TABLET, DELAYED RELEASE ORAL AS NEEDED
Refills: 0 | Status: ACTIVE | COMMUNITY
Start: 2021-09-28

## 2022-07-10 RX ORDER — MULTIVIT-MIN/FOLIC/VIT K/LYCOP 400-300MCG
TABLET ORAL TWICE A DAY
Refills: 0 | Status: ACTIVE | COMMUNITY
Start: 2021-09-28

## 2022-07-10 RX ORDER — ASPIRIN 325 MG/1
TABLET ORAL
Refills: 0 | Status: ACTIVE | COMMUNITY
Start: 2021-09-28

## 2022-07-10 RX ORDER — CARVEDILOL 25 MG/1
TABLET, FILM COATED ORAL TWICE A DAY
Refills: 0 | Status: ACTIVE | COMMUNITY
Start: 2021-09-28

## 2022-07-10 RX ORDER — IBUPROFEN 800 MG/1
TABLET, FILM COATED ORAL
Refills: 0 | Status: ACTIVE | COMMUNITY
Start: 2021-09-28

## 2022-07-10 RX ORDER — OMEGA-3/DHA/EPA/FISH OIL 1000 MG
CAPSULE ORAL TWICE A DAY
Refills: 0 | Status: ACTIVE | COMMUNITY
Start: 2021-09-28

## 2022-07-10 RX ORDER — LANSOPRAZOLE 30 MG/1
TAKE 1 CAPSULE BY MOUTH  TWICE DAILY CAPSULE, DELAYED RELEASE ORAL
Refills: 3 | Status: ACTIVE | COMMUNITY
Start: 2022-02-28

## 2022-07-10 RX ORDER — METFORMIN HCL 1000 MG/1
TABLET ORAL TWICE A DAY
Refills: 0 | Status: ACTIVE | COMMUNITY
Start: 2021-09-28

## 2022-07-10 RX ORDER — FLUOXETINE HYDROCHLORIDE 40 MG/1
CAPSULE ORAL ONCE A DAY
Refills: 0 | Status: ACTIVE | COMMUNITY
Start: 2021-09-28

## 2022-07-10 RX ORDER — HYDROCHLOROTHIAZIDE 25 MG/1
TABLET ORAL ONCE A DAY
Refills: 0 | Status: ACTIVE | COMMUNITY
Start: 2021-09-28

## 2022-07-10 RX ORDER — ATORVASTATIN CALCIUM 40 MG/1
TABLET, FILM COATED ORAL
Refills: 0 | Status: ACTIVE | COMMUNITY
Start: 2021-09-28

## 2022-07-10 RX ORDER — CELECOXIB 200 MG/1
CAPSULE ORAL ONCE A DAY
Refills: 0 | Status: ACTIVE | COMMUNITY
Start: 2021-09-28

## 2022-07-10 RX ORDER — INSULIN LISPRO 100 [IU]/ML
INJECT INTO THE SKIN CONTINUOUS. USES IN INSULIN PUMP INJECTION, SOLUTION INTRAVENOUS; SUBCUTANEOUS TAKE AS DIRECTED
Refills: 0 | Status: ACTIVE | COMMUNITY
Start: 2021-09-28

## 2022-07-10 RX ORDER — METFORMIN HCL 1000 MG/1
TABLET ORAL TWICE A DAY
Refills: 0 | Status: ACTIVE | COMMUNITY
Start: 2018-11-29

## 2022-07-10 RX ORDER — ALPRAZOLAM 0.25 MG/1
TABLET ORAL AS NEEDED
Refills: 0 | Status: ACTIVE | COMMUNITY
Start: 2021-09-28